# Patient Record
Sex: MALE | Race: WHITE
[De-identification: names, ages, dates, MRNs, and addresses within clinical notes are randomized per-mention and may not be internally consistent; named-entity substitution may affect disease eponyms.]

---

## 2020-04-14 ENCOUNTER — HOSPITAL ENCOUNTER (INPATIENT)
Dept: HOSPITAL 25 - ED | Age: 80
LOS: 1 days | Discharge: ANOTHER HOSPITAL | DRG: 872 | End: 2020-04-15
Attending: HOSPITALIST | Admitting: HOSPITALIST
Payer: MEDICARE

## 2020-04-14 DIAGNOSIS — M17.0: ICD-10-CM

## 2020-04-14 DIAGNOSIS — E78.00: ICD-10-CM

## 2020-04-14 DIAGNOSIS — E11.65: ICD-10-CM

## 2020-04-14 DIAGNOSIS — E78.5: ICD-10-CM

## 2020-04-14 DIAGNOSIS — N17.9: ICD-10-CM

## 2020-04-14 DIAGNOSIS — M47.9: ICD-10-CM

## 2020-04-14 DIAGNOSIS — F32.9: ICD-10-CM

## 2020-04-14 DIAGNOSIS — K76.0: ICD-10-CM

## 2020-04-14 DIAGNOSIS — K81.0: ICD-10-CM

## 2020-04-14 DIAGNOSIS — M16.0: ICD-10-CM

## 2020-04-14 DIAGNOSIS — I10: ICD-10-CM

## 2020-04-14 DIAGNOSIS — J38.7: ICD-10-CM

## 2020-04-14 DIAGNOSIS — R94.31: ICD-10-CM

## 2020-04-14 DIAGNOSIS — Z79.4: ICD-10-CM

## 2020-04-14 DIAGNOSIS — A41.9: Primary | ICD-10-CM

## 2020-04-14 DIAGNOSIS — K21.9: ICD-10-CM

## 2020-04-14 DIAGNOSIS — J98.6: ICD-10-CM

## 2020-04-14 DIAGNOSIS — N40.0: ICD-10-CM

## 2020-04-14 DIAGNOSIS — E83.42: ICD-10-CM

## 2020-04-14 DIAGNOSIS — Z88.0: ICD-10-CM

## 2020-04-14 DIAGNOSIS — M19.011: ICD-10-CM

## 2020-04-14 DIAGNOSIS — M19.012: ICD-10-CM

## 2020-04-14 LAB
ALBUMIN SERPL BCG-MCNC: 3.6 G/DL (ref 3.2–5.2)
ALBUMIN/GLOB SERPL: 1.1 {RATIO} (ref 1–3)
ALP SERPL-CCNC: 62 U/L (ref 34–104)
ALT SERPL W P-5'-P-CCNC: 18 U/L (ref 7–52)
AMYLASE SERPL-CCNC: 17 U/L (ref 29–103)
ANION GAP SERPL CALC-SCNC: 10 MMOL/L (ref 2–11)
AST SERPL-CCNC: 15 U/L (ref 13–39)
BASOPHILS # BLD AUTO: 0.1 10^3/UL (ref 0–0.2)
BUN SERPL-MCNC: 26 MG/DL (ref 6–24)
BUN/CREAT SERPL: 17 (ref 8–20)
CALCIUM SERPL-MCNC: 9 MG/DL (ref 8.6–10.3)
CHLORIDE SERPL-SCNC: 94 MMOL/L (ref 101–111)
CK MB SERPL-MCNC: 2.3 NG/ML (ref 0.6–6.3)
CK SERPL-CCNC: 100 U/L (ref 10–223)
EOSINOPHIL # BLD AUTO: 0 10^3/UL (ref 0–0.6)
GLOBULIN SER CALC-MCNC: 3.2 G/DL (ref 2–4)
GLUCOSE SERPL-MCNC: 284 MG/DL (ref 70–100)
HCO3 SERPL-SCNC: 26 MMOL/L (ref 22–32)
HCT VFR BLD AUTO: 46 % (ref 42–52)
HGB BLD-MCNC: 15.6 G/DL (ref 14–18)
LYMPHOCYTES # BLD AUTO: 0.7 10^3/UL (ref 1–4.8)
MAGNESIUM SERPL-MCNC: 1.2 MG/DL (ref 1.9–2.7)
MCH RBC QN AUTO: 28 PG (ref 27–31)
MCHC RBC AUTO-ENTMCNC: 34 G/DL (ref 31–36)
MCV RBC AUTO: 82 FL (ref 80–94)
MONOCYTES # BLD AUTO: 1 10^3/UL (ref 0–0.8)
NEUTROPHILS # BLD AUTO: 18.6 10^3/UL (ref 1.5–7.7)
NRBC # BLD AUTO: 0 10^3/UL
NRBC BLD QL AUTO: 0.1
PLATELET # BLD AUTO: 221 10^3/UL (ref 150–450)
POTASSIUM SERPL-SCNC: 3.6 MMOL/L (ref 3.5–5)
PROT SERPL-MCNC: 6.8 G/DL (ref 6.4–8.9)
RBC # BLD AUTO: 5.59 10^6 /UL (ref 4.18–5.48)
RBC UR QL AUTO: (no result)
SODIUM SERPL-SCNC: 130 MMOL/L (ref 135–145)
TROPONIN I SERPL-MCNC: 0.01 NG/ML (ref ?–0.03)
TSH SERPL-ACNC: 1.17 MCIU/ML (ref 0.34–5.6)
WBC # BLD AUTO: 20.4 10^3/UL (ref 3.5–10.8)
WBC UR QL AUTO: (no result)

## 2020-04-14 PROCEDURE — 82150 ASSAY OF AMYLASE: CPT

## 2020-04-14 PROCEDURE — 90732 PPSV23 VACC 2 YRS+ SUBQ/IM: CPT

## 2020-04-14 PROCEDURE — 99284 EMERGENCY DEPT VISIT MOD MDM: CPT

## 2020-04-14 PROCEDURE — 84484 ASSAY OF TROPONIN QUANT: CPT

## 2020-04-14 PROCEDURE — 71045 X-RAY EXAM CHEST 1 VIEW: CPT

## 2020-04-14 PROCEDURE — 96365 THER/PROPH/DIAG IV INF INIT: CPT

## 2020-04-14 PROCEDURE — 87086 URINE CULTURE/COLONY COUNT: CPT

## 2020-04-14 PROCEDURE — 93005 ELECTROCARDIOGRAM TRACING: CPT

## 2020-04-14 PROCEDURE — 84443 ASSAY THYROID STIM HORMONE: CPT

## 2020-04-14 PROCEDURE — 83605 ASSAY OF LACTIC ACID: CPT

## 2020-04-14 PROCEDURE — 93306 TTE W/DOPPLER COMPLETE: CPT

## 2020-04-14 PROCEDURE — 76705 ECHO EXAM OF ABDOMEN: CPT

## 2020-04-14 PROCEDURE — 85025 COMPLETE CBC W/AUTO DIFF WBC: CPT

## 2020-04-14 PROCEDURE — 82947 ASSAY GLUCOSE BLOOD QUANT: CPT

## 2020-04-14 PROCEDURE — 86140 C-REACTIVE PROTEIN: CPT

## 2020-04-14 PROCEDURE — 82550 ASSAY OF CK (CPK): CPT

## 2020-04-14 PROCEDURE — 36415 COLL VENOUS BLD VENIPUNCTURE: CPT

## 2020-04-14 PROCEDURE — 85610 PROTHROMBIN TIME: CPT

## 2020-04-14 PROCEDURE — 80053 COMPREHEN METABOLIC PANEL: CPT

## 2020-04-14 PROCEDURE — 83735 ASSAY OF MAGNESIUM: CPT

## 2020-04-14 PROCEDURE — 81003 URINALYSIS AUTO W/O SCOPE: CPT

## 2020-04-14 PROCEDURE — 83690 ASSAY OF LIPASE: CPT

## 2020-04-14 PROCEDURE — 81015 MICROSCOPIC EXAM OF URINE: CPT

## 2020-04-14 PROCEDURE — 82553 CREATINE MB FRACTION: CPT

## 2020-04-14 PROCEDURE — 85730 THROMBOPLASTIN TIME PARTIAL: CPT

## 2020-04-14 PROCEDURE — 83880 ASSAY OF NATRIURETIC PEPTIDE: CPT

## 2020-04-14 RX ADMIN — PIPERACILLIN AND TAZOBACTAM SCH MLS/HR: 3; .375 INJECTION, POWDER, LYOPHILIZED, FOR SOLUTION INTRAVENOUS; PARENTERAL at 22:23

## 2020-04-14 RX ADMIN — HEPARIN SODIUM SCH UNITS: 5000 INJECTION INTRAVENOUS; SUBCUTANEOUS at 22:21

## 2020-04-14 RX ADMIN — PANTOPRAZOLE SODIUM SCH MG: 40 TABLET, DELAYED RELEASE ORAL at 20:55

## 2020-04-14 RX ADMIN — SODIUM CHLORIDE SCH MLS/HR: 900 IRRIGANT IRRIGATION at 22:18

## 2020-04-14 RX ADMIN — INSULIN LISPRO SCH UNIT: 100 INJECTION, SOLUTION INTRAVENOUS; SUBCUTANEOUS at 20:53

## 2020-04-14 RX ADMIN — MORPHINE SULFATE PRN MG: 2 INJECTION, SOLUTION INTRAMUSCULAR; INTRAVENOUS at 19:24

## 2020-04-14 RX ADMIN — SODIUM CHLORIDE SCH MLS/HR: 900 IRRIGANT IRRIGATION at 17:49

## 2020-04-14 NOTE — PN
Progress Note





- Progress Note


Date of Service: 04/14/20


Note: 





Brief Surgery Note: (full consult dictated)





S: 80 yo male w/ sudden onset of midepigastric pain Sunday pm (after eating 

chicken wings earlier in the day). This then moved to the RUQ where it has 

remained. Associated w/ nausea and vomiting Sunday, but not since. He was seen 

in his PCP (Dr. Skiff) office yesterday and was told to come to the ED by 

ambulance 2/2 ECG changes. He is not sure why he didn't come, but ultimately 

came in today because of persistent pain. His pain is actually better today (

only 1-2/10, though worse w/ deep breath, vs 10/10 pain on Sunday). He denies 

fever/chills. No prior similar episodes. No change in the color of urine or 

stool. No prior abd surgery.





PMHx: DM (> 15 yr, w/ most recent A1C of ~ 10); HTN; HLD; GERD; OA of lower back

, hips, knees, shoulders; depression. Denies cardiac hx.





O: T 99.2 /75 P 105- 112 R 19


   Gen: WN WD male, appears younger than stated age, in NAD


   Heart: tachy, but reg


   Lungs: clear


   Abd: obese; +BS; soft; moderate tenderness w/ pos Perkins sign RUQ. No masses 

or organomegaly. Reducible nontender umb hernia.





Labs: WBC 20.4  Hgb 15.6  lytes: Na 130 K 3.6 Mg 1.2  BUN 26 creat 1.53  gluc 

284  LA 1.4  t bili 3 (other LFTs nl); amylase & lipase nl





US: GB "sludge" w/ wall thickening at 4 mm and a small amt of pericholecystic 

fluid; CBD nl at 4 mm





ECG w/ Q waves and T wave changes but no prior for comparison (trop and CK 

normal)





A: acute cholecystitis





P: admission (to medical [facility] – [place], [place]); IV abx; clear liqs; repeat labs in a.m. 

Discussed w/ Dr. Haro, who will also see patient. Patient understands and 

agrees. He also understands the potential for surgical intervention (lap watson) 

either acutely if no improvement, or in the future on a semi-elective basis.

## 2020-04-14 NOTE — HP
CC:  Dr. Skiff*

 

HISTORY AND PHYSICAL:

 

DATE OF ADMISSION:  04/14/20

 

TIME OF ADMISSION:  4:30 p.m.

 

PRIMARY CARE PHYSICIAN:  Dr. Skiff.

 

CHIEF COMPLAINT:  Abdominal pain.

 

HISTORY OF PRESENT ILLNESS:  This is an 79-year-old male with a history of 
diabetes and hypertension, who presents to the emergency department with 2 days 
of abdominal pain.  The pain started Sunday night and he localized the pain to 
the epigastric area and right upper quadrant without radiation.  He had eaten 
ham and wings for Easter dinner, and shortly after that, developed nausea and 
vomiting associated with the pain.  He was able to sleep, and the following day
, he went to his primary care physician's office who got an EKG and instructed 
him to come to the emergency department due to concerns of a myocardial 
infarction.  He declined, he went home and continued to have pain, but had no 
further nausea and vomiting.  He localized the pain again to the right upper 
quadrant without radiation.  It was not associated with diarrhea or fevers or 
any further nausea and vomiting.  This morning, Dr. Cryer's nurse called him 
because he was supposed to see Dr. Cryer regarding a laryngeal mass biopsy and 
she instructed him to come to the emergency department.

 

PAST MEDICAL HISTORY:

1.  Type 2 diabetes.

2.  Hypertension.

3.  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

4.  Hyperlipidemia.

 

PAST SURGICAL HISTORY:  He has history of throat polyps 15 years ago that were 
removed.  No other surgeries.

 

MEDICATIONS:  Home medications he takes:

1.  Detemir 50 units in the evening.

2.  Trulicity 0.5 mL weekly.

3.  Jardiance 10 mg daily.

4.  Glipizide 7.5 mg in the evening and 5 mg in the morning.

5.  Losartan plus HCTZ 100/12.5 mg daily.

6.  Metformin 1500 mg daily.

7.  Omeprazole 40 mg b.i.d.

8.  Rosuvastatin 40 mg daily.

9.  Venlafaxine 37.5 mg daily.

 

ALLERGIES:  PENICILLIN.

 

FAMILY HISTORY:  Significant for an unknown type of cancer.

 

SOCIAL HISTORY:  He never smoked tobacco.  He does not drink alcohol.  He lives 
with his wife, Nicole, who is his healthcare proxy.  He is full code.

 

REVIEW OF SYSTEMS:  He denies fevers, chills, chest pain, shortness of breath, 
weight gain, weight loss, sick contacts.  The remainder of the review of 
systems is as per the HPI.

 

                               PHYSICAL EXAMINATION

 

GENERAL:  Alert, well-appearing man who appears younger than stated age.

 

VITAL SIGNS:  Temperature 99.2, heart rate 101, respiratory rate 19, pulse ox 95
% on room air, blood pressure 143/79.

 

HEENT:  Pupils equal, round, and reactive to light.  Oral mucosa is very dry. 
Voice is hoarse.

 

NECK:  No JVP or adenopathy.

 

CHEST:  He is mildly tachycardic with no murmurs.  His lungs are clear 
bilaterally.

 

ABDOMEN:  Soft, tender in the right upper quadrant with a positive Perkins's 
sign. No CVA tenderness.  Bowel sounds are normoactive.

 

EXTREMITIES:  No edema, rashes, or ulcers.

 

NEUROLOGIC:  He is oriented and appropriate.  Occasionally forgetful.  Strength 
is 5/5 in all extremities.

 

 DIAGNOSTIC STUDIES/LAB DATA:  White blood cells 20.4, hemoglobin 15.6, and 
platelets 221.  PTT 37.  Sodium 130, potassium 3.6, chloride 94, BUN 26, 
creatinine 1.53, glucose 284.  Lactic acid 1.4.  Total bilirubin 3, magnesium 
1.2.  .69.  Troponin 0.01, 0.01.  BNP 66.  TSH 1.17.

 

Abdominal ultrasound:  Biliary sludge in the gallbladder with mild gallbladder 
wall thickening and small volume of pericholecystic fluid.  No visualized 
gallstones. Negative for sonographic Perkins's sign.  Negative for biliary 
dilatation.  Hepatic steatosis.

 

Chest x-ray:  Elevation of the right hemidiaphragm suggestive of diaphragmatic 
paralysis.

 

EKG:  Sinus tachycardia.  Left axis deviation.  Normal intervals.  No ST or T 
wave changes.  There are Q waves in III and aVF.

 

ASSESSMENT AND PLAN:  This is an 79-year-old man with a history of diabetes and 
hypertension, who presents to the emergency department with 2 days of abdominal 
pain and is found to have acute cholecystitis.

 

1.  Acute cholecystitis.  Surgery has already been consulted by the emergency 
department and [facility] has evaluated the patient.  Nonoperative management will 
be attempted and I will start him on piperacillin-tazobactam and I believe he 
needs more IV fluid resuscitation, which I will add now.  I will add some pain 
control. Of note, he does have a remote history of an allergy to PENICILLIN, 
but he thinks he last received it when he was 7 years old and his reaction was 
rash.  I will keep him on clear liquids.

2.  Sepsis, likely related to cholecystitis.  He did not receive the full 
sepsis IV fluids in the emergency department because there was some concern for 
history of congestive heart failure; however, he denies that to me and he has 
no evidence of congestive heart failure on my exam.  So, I am giving him the 
full 30 cc/kg of IV fluids along with Zosyn.  His lactic acid is normal.

3.  Type 2 diabetes.  I will reduce his dose of long-acting insulin while he is 
on clear liquids only and septic.  I reduced his long-acting insulin to 30 
units daily along with fingersticks and insulin sliding scale.

4.  Hypertension.  I will hold his losartan and HCTZ as he was hypotensive when 
he arrived; these can be added back as needed.

5.  Hypomagnesemia.  Likely related to poor p.o. intake.  Replete now.

6.  DVT prophylaxis.  Heparin subcutaneous.

7.  Acute kidney injury versus chronic kidney disease.  I have no baseline 
creatinine.  We will attempt to get records from his PCP.

8.  Abnormal EKG.  He has evidence of Q waves on his EKG.  I have requested 
records from his PCP of old EKGs as well.

9.  Full code.

 

 [id number]/CPS #: [id number]

MTDD

## 2020-04-14 NOTE — CONS
CC:  Dr. Skiff, Lena*

 

SURGICAL CONSULT NOTE:

 

DATE OF CONSULT:  04/14/20

 

ATTENDING SURGEON:  Dr. Radames Haro.

 

CHIEF COMPLAINT:  Abdominal pain.

 

HISTORY OF PRESENT ILLNESS:  This is an 79-year-old male who states that Sunday 
evening he noted sudden onset of abdominal pain in the epigastric region.  This 
was severe (up to 10/10) and steady and was associated with both nausea and 
some limited vomiting on Sunday.  He previously had some chicken wings to eat 
earlier in the day.  He denies fever or chills.  He denies diarrhea.  He did 
have normal bowel movement a couple of days ago.  He presented to his PCP 
office yesterday, at which time EKG changes suggested an acute cardiac problem 
and he was advised to come to the ED by ambulance.  He refused.  Because of 
persistent symptoms, he presented to the ED today.  He states that at the 
present time pain at rest is approximately 1- 2/10, though worse if he takes a 
deep breath or with palpation.  He denies any similar previous episodes.  He 
has not noted any change in the color of his urine or stools.  He denies any  
symptoms.  There is no definite known family history of gallbladder disease.  
He has not had any prior abdominal surgeries.

 

PAST MEDICAL HISTORY:  Type 2 diabetes for greater than 15 years (he states 
that his most recent A1c was around 10).  He is also treated for hypertension, 
GERD, hyperlipidemia, and depression.  He has a history of osteoarthritis of 
the lower back as well as hips, knees, and shoulders.  He denies history of 
heart disease, asthma, COPD, or personal history of cancer.

 

PAST SURGICAL HISTORY:  Previous surgeries are limited to removal of laryngeal 
polyps (he was seen by Dr. Cryer last week for evaluation of same with 
tentative procedure or biopsy planned possibly for this week).

 

CURRENT MEDICATIONS AT HOME:

1.  Dulaglutide 0.5 mL subcutaneously once weekly.

2.  Empagliflozin 10 mg once daily.

3.  Insulin detemir 50 units q.p.m.

4.  Glipizide 5 mg b.i.d.

5.  Metformin 500 mg b.i.d.

6.  Losartan/HCTZ 100/12.5 once daily.

7.  Omeprazole 40 mg b.i.d.

8.  Rosuvastatin 40 mg once daily.

9.  Venlafaxine extended release 37.5 mg once daily.

 

DRUG ALLERGIES:  PENICILLIN (reaction not specified).

 

FAMILY HISTORY:  Negative for gallbladder disease.

 

SOCIAL HISTORY:  The patient is .  He previously worked in a 
[employer]. He denies use of tobacco.  He used to drink alcohol excessively at 
times, but not in recent years.  He denies use of recreational drugs.

 

REVIEW OF SYSTEMS:  General:  As above.  Weight has been stable.  No other 
recent constitutional or acute symptoms.  Skin:  No recent rashes or lesions.  
HEENT: Recently seen by Dr. Cryer regarding evaluation of laryngeal polyps.  No 
other acute problems reported.  Cardiovascular:  As above.  No additions.  
Respiratory: No additions.  GI:  As above.  :  No additions.  Endocrine:  
Type 2 diabetes.  No history of thyroid dysfunction.

 

PHYSICAL EXAM:  Height 6 feet 1 inch, weight 201 pounds, temperature 99.2, 
blood pressure 127/75, pulse 105 to 112, respirations 19, room air saturation 94
%. General:  Well-nourished, well-developed male, in no acute distress, 
appearing fairly comfortable and younger than his stated age of 79.  Skin:  
Warm and dry.  No suspicious rashes or lesions.  HEENT:  Pupils are equal, round
, and reactive.  EOMs intact.  No conjunctival pallor or scleral icterus.  
Oropharynx:  Teeth in good repair.  No intraoral lesions.  Neck:  No 
lymphadenopathy, thyromegaly, or masses. Heart:  Regular rate and rhythm.  
Mildly tachycardic.  No murmur appreciated. Lungs:  Clear to auscultation.  No 
rales or wheezes.  Abdomen:  Mildly obese. Bowel sounds present.  Small, 
nontender, reducible umbilical hernia.  Abdomen is generally soft, but with 
moderate tenderness in the right upper quadrant and positive Perkins sign.  No 
palpable masses or organomegaly.  No palpable hernias other than at the 
umbilicus.  Back:  No spinous process or CVA tenderness. Extremities:  No 
edema.  Neurological:  Grossly intact, though he is not always consistent in 
his history.

 

DIAGNOSTIC STUDIES/LAB DATA:  Of note, white blood cell count 20,400, 
hemoglobin 15.6, hematocrit 46.  Electrolytes notable for sodium of 130, 
potassium of 3.6, chloride of 94, magnesium of 1.2, lactic acid is normal at 1.4
, BUN and creatinine are elevated at 26 and 1.53, glucose is 284.  CRP is 265.  
Total bilirubin is 3 (no previous for comparison).  Other liver function tests 
are normal as are amylase and lipase.  Troponin is 0.01, CK-MB is normal at 2.3.

 

Diagnostic imaging includes a chest x-ray showing elevated right hemidiaphragm.

 

Ultrasound of the right upper quadrant shows gallbladder sludge with 
gallbladder wall thickening of 4.3 mm and a small amount of pericholecystic 
fluid.  There is slight increased echogenicity of the liver.  The common bile 
duct is normal at 4 mm.

 

IMPRESSION:  Acute cholecystitis.

 

PLAN:  Case was discussed with Dr. Haro as well as Dr. Ceja.  He will be 
admitted to the hospitalist service with initiation of IV antibiotics as well 
as pain control and management of his diabetes and medical comorbidities.  
Repeat lab work in the morning with serial abdominal exams.  Hopefully, the 
patient will improve with medical management at the present time.  He 
understands that if he is not improving he may require surgical intervention 
acutely during this admission (i.e., laparoscopic cholecystectomy) and/or 
cholecystectomy may be recommended at a later date.

 

____________________________________ 

ED DE LA CRUZ

 

[id number]/CPS #: [id number]

JUVENCIO

## 2020-04-14 NOTE — ED
Abdominal Pain/Male





- HPI Summary


HPI Summary: 





This patient is an 78 y/[facility]. Patient 

states yesterday he had pain in the epigastric area/lower chest area. He notes 

he also developed nausea and vomiting yesterday. Today patient states he has 

mild pain in his right upper abdomen. Patient reports he was sent to the ED by 

his primary care provider because he was told he had a small heart attack. 

Patient denies fever, chills, shortness of breath, headache. 





 Home Medications











 Medication  Instructions  Recorded  Confirmed  Type


 


Dulaglutide (NF) [Trulicity (NF)] 0.5 ml SUBCUT WEEKLY 04/14/20 04/14/20 History


 


Empagliflozin (NF) [Jardiance (Nf)] 10 mg PO QAM 04/14/20 04/14/20 History


 


Insulin Detemir [Levemir Flextouch 50 units SUBCUT DAILY 04/14/20 04/14/20 

History





100 units/ml 3 ml x 5 Pens]    


 


Losartan/Hydrochlorothiazide 1 tab PO DAILY 04/14/20 04/14/20 History





[Losartan Potassium/Hydroc    





100-12.5 mg]    


 


Omeprazole (Nf) [Prilosec (NF)] 40 mg PO BID 04/14/20 04/14/20 History


 


Rosuvastatin (NF) [Crestor (NF)] 40 mg PO DAILY 04/14/20 04/14/20 History


 


Venlafaxine EXT RELEASE CAP* 37.5 mg PO DAILY 04/14/20 04/14/20 History





[Effexor Xr CAP*]    


 


glipiZIDE TAB* [Glucotrol TAB*] 2.5 mg PO QPM 04/14/20 04/14/20 History


 


glipiZIDE TAB* [Glucotrol TAB*] 5 mg PO BID 04/14/20 04/14/20 History


 


metFORMIN* [Glucophage 500 MG TAB 1,500 mg PO .WITH EVENING MEAL 04/14/20 04/14/ 20 History





*]    














- History of Current Complaint


Chief Complaint: EDShortnessOfBreath


Stated Complaint: FEVER, SORE THROAT


Hx Obtained From: Patient


Onset/Duration: Lasting Days - 1, Still Present


Timing: Lasting Days - 1


Severity Currently: Mild


Pain Scale Used: 0-10 Numeric


Location: Other - right upper quadrant


Radiates: No


Aggravating Factor(s): Nothing


Alleviating Factor(s): Nothing


Associated Signs And Symptoms: Positive: Chest Pain, Nausea, Vomiting.  Negative

: Fever, Other - NEGATIVE: shortness of breath, headache





- Allergies/Home Medications


Allergies/Adverse Reactions: 


 Allergies











Allergy/AdvReac Type Severity Reaction Status Date / Time


 


Penicillins Allergy  Rash Verified 04/14/20 12:38











Home Medications: 


 Home Medications





Dulaglutide (NF) [Trulicity (NF)] 0.5 ml SUBCUT WEEKLY 04/14/20 [History 

Confirmed 04/14/20]


Empagliflozin (NF) [Jardiance (Nf)] 10 mg PO QAM 04/14/20 [History Confirmed 04/ 14/20]


Insulin Detemir [Levemir Flextouch 100 units/ml 3 ml x 5 Pens] 50 units SUBCUT 

DAILY 04/14/20 [History Confirmed 04/14/20]


Losartan/Hydrochlorothiazide [Losartan Potassium/Hydroc 100-12.5 mg] 1 tab PO 

DAILY 04/14/20 [History Confirmed 04/14/20]


Omeprazole (Nf) [Prilosec (NF)] 40 mg PO BID 04/14/20 [History Confirmed 04/14/ 20]


Rosuvastatin (NF) [Crestor (NF)] 40 mg PO DAILY 04/14/20 [History Confirmed 04/ 14/20]


Venlafaxine EXT RELEASE CAP* [Effexor Xr CAP*] 37.5 mg PO DAILY 04/14/20 [

History Confirmed 04/14/20]


glipiZIDE TAB* [Glucotrol TAB*] 2.5 mg PO QPM 04/14/20 [History Confirmed 04/14/ 20]


glipiZIDE TAB* [Glucotrol TAB*] 5 mg PO BID 04/14/20 [History Confirmed 04/14/20

]


metFORMIN* [Glucophage 500 MG TAB *] 1,500 mg PO .WITH EVENING MEAL 04/14/20 [

History Confirmed 04/14/20]


Atorvastatin* [Lipitor 80 MG*] 80 mg PO DAILY  tab 04/15/20 [Rx]


HYDROmorphone INJ1* [Dilaudid INJ1S*] 1.5 mg IV SLOW PU Q4H PRN  syringe 04/15/

20 [Rx]


Insulin GLARGINE(*) [Lantus 100 units/ml 10 ml VIAL (*)] 40 units SUBCUT Q24H  

unit 04/15/20 [Rx]


Insulin LISPRO* [HumaLOG 100 units/ml 3 ml VIAL *] 0 units SUBCUT ACHS  unit 04/

15/20 [Rx]


Pantoprazole TAB * [Protonix TAB*] 40 mg PO BID  tab 04/15/20 [Rx]


Venlafaxine EXT RELEASE CAP* [Effexor Xr CAP*] 37.5 mg PO DAILY  cap.sr 04/15/

20 [Rx]


Zosyn per Pharmacy* 1 note FOLLOW UP .ZOSYN PER PHARMACY  note 04/15/20 [Rx]











PMH/Surg Hx/FS Hx/Imm Hx


Previously Healthy: No - PMHx of obesity


Endocrine/Hematology History: Reports: Hx Diabetes - type 2, Other Endocrine/

Hematological Disorders - hypomagnesemia


Cardiovascular History: Reports: Hx Hypercholesterolemia, Hx Hypertension


 History: Reports: Hx Benign Prostatic Hyperplasia


Infectious Disease History: 


   Denies: Traveled Outside the [place] in Last 30 Days





- Family History


Known Family History: 


   Negative: Cardiac Disease





- Social History


Alcohol Use: None


Alcohol Amount: quit drinking in 2008


Substance Use Type: Reports: None


Smoking Status (MU): Never Smoked Tobacco





Review of Systems


Negative: Fever, Chills


Positive: Chest Pain


Negative: Shortness Of Breath


Positive: Abdominal Pain, Vomiting, Nausea


Negative: Headache


All Other Systems Reviewed And Are Negative: Yes





Physical Exam





- Summary


Physical Exam Summary: 





VITAL SIGNS: Reviewed.


GENERAL: Patient is a well-developed and nourished male who is lying 

comfortable in the stretcher. Patient is not in any acute respiratory distress.


HEAD AND FACE: No signs of trauma. No ecchymosis, hematomas or skull 

depressions. No sinus tenderness.


EYES: PERRLA, EOMI x 2, No injected conjunctiva, no nystagmus.


EARS: Hearing grossly intact. Ear canals and tympanic membranes are within 

normal limits.


MOUTH: Oropharynx within normal limits.


NECK: Supple, trachea is midline, no adenopathy, no JVD, no carotid bruit, no c-

spine tenderness, neck with full ROM.


CHEST: Symmetric, no tenderness at palpation


LUNGS: Clear to auscultation bilaterally. No wheezing or crackles.


CVS: Regular rate and rhythm, S1 and S2 present, no murmurs or gallops 

appreciated.


ABDOMEN: Soft, mild tenderness in the right upper quadrant. No signs of 

distention. No rebound no guarding, and no masses palpated. Bowel sounds are 

normal.


EXTREMITIES: FROM in all major joints, no edema, no cyanosis or clubbing.


NEURO: Alert and oriented x 3. No acute neurological deficits. Speech is normal 

and follows commands.


SKIN: Dry and warm


Triage Information Reviewed: Yes


Vital Signs On Initial Exam: 





 Initial Vitals











Pulse BP Pulse Ox


 


 113   132/71   93 


 


 04/14/20 12:37  04/14/20 12:37  04/14/20 12:37





Temperature: 99.2 F temporal


Vital Signs Reviewed: Yes





Procedures





- Sedation


Patient Received Moderate/Deep Sedation with Procedure: No





Diagnostics





- Laboratory


Result Diagrams: 


 04/15/20 05:41





 04/15/20 05:41


Lab Statement: Any lab studies that have been ordered have been reviewed, and 

results considered in the medical decision making process.





- Radiology


  ** Chest XR


Radiology Interpretation Completed By: Radiologist


Summary of Radiographic Findings: IMPRESSION: Elevation of the right 

hemidiaphragm suggestive of diaphragmatic paralysis. Dr. Reese has reviewed 

this report.





- Ultrasound


  ** No standard instances


Ultrasound Interpretation Completed By: Radiologist


Summary of Ultrasound Findings: Abdomen US IMPRESSION: #. Biliary sludge in the 

gallbladder with mild gallbladder wall thickening and small volume of 

pericholecystic fluid. No visualized gallstones. #. Negative for sonographic 

Perkins's sign. #. Negative for biliary dilatation. #. Hepatosteatosis. Dr. Reese has reviewed this report.





- EKG


  ** 1233


Cardiac Rate: Tachycardia - at 114 bpm


EKG Rhythm: Sinus Tachycardia


EKG Comparison: Other - No previous EKG for comparison


Summary of EKG Findings: EKG at 1233 shows sinus tachycardia at a rate of 114 

bpm. No STEMI. Q waves in leads III, aVF, V1-V3. Inverted T wave in V6. No old 

EKG for comparison. This EKG was interpreted and reviewed by ED physician.





Re-Evaluation





- Re-Evaluation


  ** First Eval


Re-Evaluation Time: 15:20


Comment: Surgical PAAshkan, in to see patient.





Abdominal Pain Male Course/Dx





- Course


Assessment/Plan: This patient is an 78 y/o male presenting to [facility] c/o 

abdominal pain. Patient states yesterday he had pain in the epigastric area/

lower chest area. He notes he also developed nausea and vomiting yesterday. 

Today, patient states he has mild pain in his right upper abdomen. Patient 

reports he was sent to the ED by his primary care provider because he was told 

he had a small heart attack. Patient denies fever, chills, shortness of breath, 

headache.  In the ED course the patient was placed in a cardiac monitor, IV 

access was obtained, IV fluids were started.  Past medical records reviewed.  

CXR impression: Elevation of the right diaphragm suggestive of diaphragmatic 

paralysis.  Blood test w/o a significant abnormality except for WBCs of 20.4, 

absolute neutrophils is 18.6, sodium 130, chloride 94, BUN is 26, creatinine 

1.53, glucose is 284, magnesium is 1.2, total bilirubin is 3, CRP is 264.69.  

RUQ U/S IMPRESSION:  #. Biliary sludge in the gallbladder with mild gallbladder 

wall thickening and small volume of pericholecystic fluid. No visualized 

gallstones.  #. Negative for sonographic Perkins's sign.  #. Negative for 

biliary dilatation.  #. Hepatosteatosis.  At 2:36 PM I discussed the case with 

Dr. Haro, surgeon, and he will come to see the patient in the ED.  ED Johnson, working with Dr. Haro assessed the patient and recommends admission to 

the hospitalist since there is not an immediate need for surgery.  At 3:42 PM: 

I discussed my physical exam and test results with Dr. Ceja from the 

hospitalist services and she agrees to admit the patient to her services. The 

patient is hemodynamically stable.





- Diagnoses


Provider Diagnoses: 


 Acute cholecystitis








- Provider Notifications


Discussed Care Of Patient With: Radames Haro


Time Discussed With Above Provider: 14:30


Instructed by Provider To: Other - Discussed with Dr. Haro, surgeon, who will 

come see patient in the ED. [15:41] Discussed with Dr. Ceja, hospitalist, who 

accepted the patient for admission.





- Critical Care Time


Critical Care Statement: Critical care time is provided exclusive of any time 

spent performing procedures.





Discharge ED





- Sign-Out/Discharge


Documenting (check all that apply): Patient Departure - Admit to [facility]





- Discharge Plan


Condition: Stable


Disposition: ADMITTED TO [facility]





- Billing Disposition and Condition


Condition: STABLE


Disposition: Admitted to [facility]





- Attestation Statements


Document Initiated by Scribe: Yes


Documenting Scribe: Brenda Jones


Provider For Whom Scribe is Documenting (Include Credential): Adryan Reese MD


Scribe Attestation: 


I, Brenda Jones, scribed for Adryan Reese MD on 04/15/20 at 1223. 


Scribe Documentation Reviewed: Yes


Provider Attestation: 


The documentation as recorded by the scribe, Brenda Jones accurately reflects 

the service I personally performed and the decisions made by me, Adryna Reese MD


Status of Scribe Document: Viewed

## 2020-04-15 VITALS — SYSTOLIC BLOOD PRESSURE: 136 MMHG | DIASTOLIC BLOOD PRESSURE: 63 MMHG

## 2020-04-15 LAB
ALBUMIN SERPL BCG-MCNC: 3 G/DL (ref 3.2–5.2)
ALBUMIN/GLOB SERPL: 1.1 {RATIO} (ref 1–3)
ALP SERPL-CCNC: 71 U/L (ref 34–104)
ALT SERPL W P-5'-P-CCNC: 25 U/L (ref 7–52)
ANION GAP SERPL CALC-SCNC: 11 MMOL/L (ref 2–11)
AST SERPL-CCNC: 21 U/L (ref 13–39)
BASOPHILS # BLD AUTO: 0.1 10^3/UL (ref 0–0.2)
BUN SERPL-MCNC: 25 MG/DL (ref 6–24)
BUN/CREAT SERPL: 14.1 (ref 8–20)
CALCIUM SERPL-MCNC: 8.2 MG/DL (ref 8.6–10.3)
CHLORIDE SERPL-SCNC: 97 MMOL/L (ref 101–111)
EOSINOPHIL # BLD AUTO: 0 10^3/UL (ref 0–0.6)
GLOBULIN SER CALC-MCNC: 2.8 G/DL (ref 2–4)
GLUCOSE SERPL-MCNC: 308 MG/DL (ref 70–100)
HCO3 SERPL-SCNC: 23 MMOL/L (ref 22–32)
HCT VFR BLD AUTO: 41 % (ref 42–52)
HGB BLD-MCNC: 14.2 G/DL (ref 14–18)
INR PPP/BLD: 1.55 (ref 0.82–1.09)
LYMPHOCYTES # BLD AUTO: 0.6 10^3/UL (ref 1–4.8)
MAGNESIUM SERPL-MCNC: 1.4 MG/DL (ref 1.9–2.7)
MCH RBC QN AUTO: 28 PG (ref 27–31)
MCHC RBC AUTO-ENTMCNC: 35 G/DL (ref 31–36)
MCV RBC AUTO: 81 FL (ref 80–94)
MONOCYTES # BLD AUTO: 1 10^3/UL (ref 0–0.8)
NEUTROPHILS # BLD AUTO: 19.5 10^3/UL (ref 1.5–7.7)
NRBC # BLD AUTO: 0 10^3/UL
NRBC BLD QL AUTO: 0
PLATELET # BLD AUTO: 202 10^3/UL (ref 150–450)
POTASSIUM SERPL-SCNC: 3.4 MMOL/L (ref 3.5–5)
PROT SERPL-MCNC: 5.8 G/DL (ref 6.4–8.9)
RBC # BLD AUTO: 4.98 10^6 /UL (ref 4.18–5.48)
SODIUM SERPL-SCNC: 131 MMOL/L (ref 135–145)
WBC # BLD AUTO: 21.2 10^3/UL (ref 3.5–10.8)

## 2020-04-15 RX ADMIN — PANTOPRAZOLE SODIUM SCH MG: 40 TABLET, DELAYED RELEASE ORAL at 09:31

## 2020-04-15 RX ADMIN — PIPERACILLIN AND TAZOBACTAM SCH MLS/HR: 3; .375 INJECTION, POWDER, LYOPHILIZED, FOR SOLUTION INTRAVENOUS; PARENTERAL at 05:49

## 2020-04-15 RX ADMIN — INSULIN LISPRO SCH UNIT: 100 INJECTION, SOLUTION INTRAVENOUS; SUBCUTANEOUS at 09:30

## 2020-04-15 RX ADMIN — MORPHINE SULFATE PRN MG: 2 INJECTION, SOLUTION INTRAMUSCULAR; INTRAVENOUS at 01:58

## 2020-04-15 RX ADMIN — HEPARIN SODIUM SCH UNITS: 5000 INJECTION INTRAVENOUS; SUBCUTANEOUS at 05:51

## 2020-04-15 RX ADMIN — INSULIN LISPRO SCH UNIT: 100 INJECTION, SOLUTION INTRAVENOUS; SUBCUTANEOUS at 12:04

## 2020-04-15 NOTE — ECHO
*[facility]*

[place] [address]

Phone: [phone]

Fax #: [phone]



-------------------------------------------------------------------

Transthoracic Echocardiogram



Patient: Ranjit Dotson                    MRN:        [id number]

:     1940                         Study Date: 04/15/2020

Age:     79                                 Accession#: [id number]

Gender:  M                                  HR:         107 bpm

Height:  72 in /182.9 cm                    BSA:        2.13 m^2

Weight:  200.6 lb /91.2 kg                  BMI:        27.3 kg/m^2



*Sonographer: * Leeann Francois [facility]

 

*Referring Physician: * Gladys Ceja

*Reading Physician: * Estephania Au MD



-------------------------------------------------------------------

Indications:   Abnormal EKG.



-------------------------------------------------------------------

History:   Risk factors:  Hypertension. Diabetes mellitus.

Dyslipidemia.



-------------------------------------------------------------------

Conclusions



Summary:



- Left ventricle: The cavity size is mildly reduced. Wall thickness

  is mildly to moderately increased. Wall motion is normal; there

  are no regional wall motion abnormalities.

- Right ventricle: Systolic function is normal.

- Mitral valve: The leaflets are mildly thickened.

- Aortic valve: Cusp separation is normal.

- Tricuspid valve: There is trace regurgitation.

- Pulmonary arteries: Systolic pressure is mildly increased. The

  peak pressure during systole by Doppler is 37.0 mm Hg.

- No prior echocardiogram to compare.



-------------------------------------------------------------------

Study data:  Transthoracic echocardiogram.  Procedure:

Transthoracic echocardiography was performed. Image quality was

good.  Complete 2D, spectral Doppler, and color flow Doppler.

Location:  Bedside.  Patient status:  Inpatient. Patient room

number: 348.  Rhythm:  Tachycardia.



-------------------------------------------------------------------

Findings



Left ventricle:  The cavity size is mildly reduced. Wall thickness

is mildly to moderately increased. Systolic function is normal. The

estimated ejection fraction is 60-65%. Wall motion is normal; there

are no regional wall motion abnormalities. Left ventricular

diastolic function parameters are normal for the patient&apos;s age.

Right ventricle:  The cavity size is normal. Systolic function is

normal.

Left atrium:  The atrium is normal in size.

Right atrium:  The atrium is normal in size.

Mitral valve:  The leaflets are mildly thickened.  There is no

evidence of stenosis.   There is no significant regurgitation.

Aortic valve:   The valve is trileaflet. The leaflets are normal

thickness. Cusp separation is normal.  There is no evidence of

stenosis.   There is no significant regurgitation.

Tricuspid valve:  The leaflets are normal thickness.  There is no

evidence of stenosis.   There is trace regurgitation.

Pulmonic valve:   The leaflets are normal thickness.  There is no

evidence of stenosis.   There is mild regurgitation.

Aorta:  The aortic root appears normal. The aortic arch appears

normal.

Pericardium:  There is no significant pericardial effusion.

Pulmonary arteries:

Systolic pressure is mildly increased.

Systemic veins:

Inferior vena cava: Not well visualized.



-------------------------------------------------------------------

Measurements



 Left ventricle            Value        Ref         Aortic valve continued     Value        Ref

 JOSE, LAX          (L)     3.8   cm     4.2 - 5.8   Peak v, S                  1.26  m/sec  ---------


 ESD, LAX                  2.5   cm     2.5 - 4.0   VTI, S                     20.3  cm     ---------


 FS, LAX                   34    %      25 - 43     Mean grad, S               4.0   mm Hg  ---------


 PW, ED, LAX       (H)     1.2   cm     0.6 - 1.0   Peak grad, S               6.0   mm Hg  ---------


 E&apos;, lat kenn, TDI          10.2  cm/sec &gt;=10.0      LVOT/AV, VTI               0.89         -
--------

 E/e&apos;, lat kenn,            6            ----------  ratio

 TDI

 E&apos;, med kenn, TDI          8.1   cm/sec &gt;=7.0       Mitral valve               Value        R
ef

 E/e&apos;, med kenn,            8            ----------  Peak E                     0.66  m/sec  ----
-----

 TDI                                                Peak A                     0.86  m/sec  ---------


 E&apos;, avg, TDI              9.2   cm/sec ----------  Decel time                 61    ms     ----
-----

 E/e&apos;, avg, TDI            7            &lt;=14        Peak E/A ratio             0.8          -
--------

 

 LVOT                      Value        Ref         Pulmonic valve             Value        Ref

 Peak fredy, S               1     m/sec  ----------  Peak v, S                  0.75  m/sec  ---------


 VTI, S                    18.0  cm     ----------  Peak grad, S               2.0   mm Hg  ---------


 Peak grad, S              4     mm Hg  ----------

 Mean grad, S              2     mm Hg  ----------  Tricuspid valve            Value        Ref

                                                    TR peak v         (H)      3     m/sec  &lt;=2.8

 Ventricular septum        Value        Ref         Peak RV-RA grad,           36    mm Hg  ---------


 IVS, ED           (H)     1.4   cm     0.6 - 1.0   S

 

 Right ventricle           Value        Ref         Aortic root                Value        Ref

 JOSE, LAX                  3.7   cm     ----------  Root diam                  3.3   cm     &lt;4.2

 JOSE minor ax, A4C         3.1   cm     1.9 - 3.5   Root max                   1.6   cm/m^2 1.3 - 2.1


 mid                                                diam/bsa, ED

 Pressure, S               39    mm Hg  ----------

                                                    Ascending aorta            Value        Ref

 Left atrium               Value        Ref         AAo AP diam, S             2.4   cm     ---------


 AP dim, ES                4.00  cm     3.00 -      AAo AP diam/bsa,           1.1   cm/m^2 ---------


                                        4.00        S

 ML dim, A4C               4.0   cm     ----------

 SI dim, A4C               5.3   cm     ----------  Aortic arch                Value        Ref

 Vol/bsa, ES, A/L          24    ml/m^2 16 - 34     Arch diam                  2.8   cm     ---------


                                                    Arch diam/bsa              1.3   cm/m^2 ---------


 Right atrium              Value        Ref

 SI dim, ES                5.0   cm     3.4 - 5.3   Decending aorta            Value        Ref

 ML dim, ES, A4C           3.6   cm     2.6 - 4.4   Capo peak fredy               0.88  m/sec  ---------


 Estimated RAP             3     mm Hg  ----------

                                                    Pulmonary artery           Value        Ref

 Aortic valve              Value        Ref         Pressure, S                37.0  mm Hg  ---------


 Kenn diam, ED              2.3   cm     ----------

 

Legend:

(L)  and  (H)  henry values outside specified reference range.



Prepared and electronically signed by



Estephania Au MD

04/15/2020 12:11

## 2020-04-15 NOTE — PN
Progress Note





- Progress Note


Date of Service: 04/15/20


Note: 











Patient seen and examined


Workup-labs and US reviewed


Care discussed with KOURTNEY Ceja with Hospitalist service





He is having more pain RUQ


No fever but HR up





PEX:


Appears uncomfortable


Abd is soft and distended. BS decreased. Tenderness with guarding RUQ with 

localized peritoneal signs-no generalized abdominal pain





IMP: Acute cholecystitis


WBC elevated from yesterday, BS elevated and appears septic





Discussed with Dr. Ceja. She feel he is a high operative risk for 

cholecystectomy. He is failing medical care with IV antibiotics and I'm 

concerned he will worsen. Recommend percutaneous drainage as initial treatment 

course in light of his present condition and underlying medical problems. IR 

not available here at [facility] this week.


Will transfer to a higher level of care where IR services available today. All 

discussed with patient.

## 2020-04-15 NOTE — TRS
CC:  Dr. Skiff, [facility]*

 

DATE OF ADMISSION:  04/14/2020.

 

DATE OF TRANSFER:  04/15/2020.

 

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  He 
has been accepted to [facility] by Dr. Rosales of the Medicine 
Service.

 

PRINCIPAL DISCHARGE DIAGNOSES:

1.  Acute cholecystitis.

2.  Sepsis.

3.  Abnormal EKG.

 

SECONDARY DISCHARGE DIAGNOSES:

1.  Poorly controlled diabetes.

2.  Hypertension.

3.  Acute kidney injury.

 

MEDICATIONS FOR TRANSFER:

1.  Atorvastatin 80 mg daily.

2.  Dilaudid 1.5 mg IV slow push q.4 prn pain.

3.  Lantus 40 units at bedtime.

4.  Lispro sliding scale a.c., at bedtime.

5.  Pantoprazole 40 mg b.i.d.

6.  Venlafaxine 37.5 mg daily.

7.  Zosyn 3.375 q.8.

8.  Heparin 5,000 units q.8.

 

PHYSICAL EXAMINATION:  Please see my progress note from this morning.

 

PERTINENT STUDIES ON THIS ADMISSION:  An abdominal ultrasound performed on 04/14
/2020 showed biliary sludge in the gallbladder with mild gallbladder wall 
thickening and small volume of pericholecystic fluid.  No visualized 
gallstones. Negative for sonographic Perkins sign.  Negative biliary dilatation 
and hepatosteatosis.

 

LABORATORY DATA:  Labs from 04/15/2020:  Sodium 131, potassium 3.4, chloride 97
, bicarb 23, BUN 25, creatinine 1.77, glucose 308, calcium 8.2, magnesium 1.4, 
total bilirubin 2.7, AST 21, ALT 25, alk phos 71; white blood cells 21.2, 
hemoglobin 14.2, platelets 202; INR 1.55.

 

CONSULTATIONS DURING THIS ADMISSION:  Dr. Monico Baker of General Surgery.

 

HOSPITAL COURSE BY PROBLEM:

1.  Acute cholecystitis with sepsis:  Mr. Dotson was admitted to the Medical 
Service with acute cholecystitis and was started on Zosyn with IV fluid 
resuscitation for sepsis an pain control.  He had minimal improvement over the 
first 12 hours and his white count increased.  His CRISTIAN worsened and his pain 
was uncontrolled.  He was re-evaluated by Surgery this morning, April 15th, and 
they recommended percutaneous drainage of the acute cholecystitis.  
Unfortunately, Interventional Radiology is not available at [facility] this week, so I 
have discussed the case with Dr. Rosales at [place] who accepts the patient to 
his service.  Today is Pip/Tazo day two.  This morning I have increased his 
volume resuscitation to normal saline at 200 cc per hour and increased his pain 
control to Dilaudid 1.5 mg q.4 prn pain.

2.  CRISTIAN:  I got records from his PCP's office.  His creatinine in January of 
this year was 1.2.  I have increased his normal saline rate as above and I 
suspect this is related to sepsis.

3.  Poorly controlled diabetes:  Mr. Dotson's A1c was 10 in January.  He is on 
a lot of p.o. diabetes medications at home.  I transitioned his Detemir to 
Lantus, but he is still uncontrolled with blood sugars in the 300s this 
morning.  He takes his Lantus in the evening and he got 30 units last night.  I 
gave him 15 units of NPH this morning to cover his throughout the day and 
increased his evening dose of Lantus tonight at 40 units nightly.  He is 
currently on clear liquids.

4.  Hypertension:  He is on Losartan HCTZ at home, but I have held both of 
these and he has remained normotensive off of them.

5.  EKG changes:  He has ischemic EKG changes with Q-waves inferiorly on his 
EKG. His troponins were negative times three here and he has no chest pain.  He 
recalls no history of an MI, but I suspect an occult MI which raises his 
operative risk as well.

6.  DVT prophylaxis:  He has been on Heparin subcutaneously since his arrival.

7.  Code status:  He is full code.  He does have a wife named Nicole who is his 
healthcare proxy.

 

CONDITION ON DISCHARGE:  Fair, guarded.

 

DISPOSITION:  Mr. Dotson has been accepted by Dr. Rosales to the Hospitalist 
Service at [place] for a higher level of care and access to interventional 
radiology for percutaneous drainage of acute cholecystitis.

 

 [id number]/[facility] #: [id number]

[facility]

## 2020-04-15 NOTE — PN
Subjective


Date of Service: 04/15/20


Interval History: 





Pain was poorly controlled last night on morphine.  BGs poorly controlled.  He 

was able to eat some jello last night.  He feels bad this morning because pain 

is still uncontrolled.  No nausea, no fevers, no shortness of breath, no chest 

pain. 





Objective


Active Medications: 








Atorvastatin Calcium (Lipitor*)  80 mg PO DAILY [facility]


Dextrose (D50w Syringe 50 Ml*)  12.5 gm IV PUSH .FOR FS < 60 - SS PRN


   PRN Reason: FS < 60


Heparin Sodium (Porcine) (Heparin Vial(*))  5,000 units SUBCUT Q8HR [facility]


   Last Admin: 04/15/20 05:51 Dose:  5,000 units


Sodium Chloride (Ns 0.9% 1000 Ml**)  1,000 mls @ 100 mls/hr IV PER RATE [facility]


   Last Admin: 04/14/20 22:18 Dose:  100 mls/hr


Piperacillin Sod/Tazobactam (Sod 3.375 gm/ Sodium Chloride)  100 mls @ 25 mls/

hr IVPB Q8H [facility]


   Last Admin: 04/15/20 05:49 Dose:  25 mls/hr


Insulin Glargine (Lantus(*))  40 units SUBCUT Q24H [facility]


Insulin Human Lispro (Humalog*)  0 units SUBCUT ACHS [facility]; Protocol


   Last Admin: 04/14/20 20:53 Dose:  10 unit


Morphine Sulfate (Morphine Inj (Syringe))*)  4 mg IV Q4H PRN


   PRN Reason: PAIN - SEVERE


   Last Admin: 04/15/20 01:58 Dose:  4 mg


Pantoprazole Sodium (Protonix Tab*)  40 mg PO BID [facility]


   Last Admin: 04/14/20 20:55 Dose:  40 mg


Pharmacy Consult (Zosyn Per Pharmacy*)  1 note FOLLOW UP .ZOSYN PER PHARMACY [facility]


Pneumococcal Polyvalent Vaccine (Pneumococcal Vac 23-Polyvalent*)  0.5 ml IM 

.ONCE ONE


   Stop: 04/15/20 09:01


Venlafaxine HCl (Effexor Xr Cap*)  37.5 mg PO DAILY [facility]








 Vital Signs - 8 hr











  04/15/20 04/15/20 04/15/20





  01:58 02:12 03:56


 


Temperature  98.9 F 


 


Pulse Rate  105 


 


Respiratory 20 18 16





Rate   


 


Blood Pressure  123/58 





(mmHg)   


 


O2 Sat by Pulse  93 





Oximetry   











Oxygen Devices in Use Now: None


Appearance: alert, ill appearing, uncomfortable


Eyes: No Scleral Icterus


Ears/Nose/Mouth/Throat: - - dry mucosa


Respiratory: Symmetrical Chest Expansion and Respiratory Effort, Clear to 

Auscultation


Cardiovascular: NL Sounds; No Murmurs; No JVD, RRR


Abdominal: - - very tender to palpation in epigastric/RUQ region, + boateng's 

sign


Lymphatic: No Cervical Adenopathy


Extremities: No Edema


Skin: No Rash or Ulcers


Neurological: Alert and Oriented x 3


Result Diagrams: 


 04/15/20 05:41





 04/15/20 05:41





Assess/Plan/Problems-Billing


Assessment: 





Mr. Dotson is an 79 year old man with poorly controlled DM (a1c >10%) and HTN 

who presented to the ED 4/14 with 48 hours of abdominal pain and was found to 

have acute cholecystitis 





- Patient Problems


(1) Acute cholecystitis


Current Visit: Yes   Status: Acute   Code(s): K81.0 - ACUTE CHOLECYSTITIS   

SNOMED Code(s): 24244038


   Comment: Surgery following, surgical plan pending clinical course 


Pip/tazo day 2 


WBCs increasing today, bili decreasing 


Afebrile


Continue pain control; switch morphine to dilaudid today 


Increase IVF 


   





(2) Sepsis


Current Visit: Yes   Status: Acute   Comment: due to #1 


needs more fluid resuscitation 


hemodynamically stable 


   





(3) Diabetes mellitus


Current Visit: Yes   Status: Acute   Code(s): E11.9 - TYPE 2 DIABETES MELLITUS 

WITHOUT COMPLICATIONS   SNOMED Code(s): 15623393


   Comment: uncontrolled 


a1c from last week from PCPs office was >10% 


increase insulin coverage today --> NPH 10U now and then increase lantus to 40U 

tonight    





(4) HTN (hypertension)


Current Visit: Yes   Status: Acute   Code(s): I10 - ESSENTIAL (PRIMARY) 

HYPERTENSION   SNOMED Code(s): 20708318


   Comment: normotensive off antihypertensives


continue to hold given sepsis    





(5) Abnormal EKG


Current Visit: Yes   Status: Acute   Code(s): R94.31 - ABNORMAL 

ELECTROCARDIOGRAM [ECG] [EKG]   SNOMED Code(s): 989588536


   Comment: will attempt to get old ekg from PCPs office 


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    





(6) CRISTIAN (acute kidney injury)


Current Visit: Yes   Status: Acute   Code(s): N17.9 - ACUTE KIDNEY FAILURE, 

UNSPECIFIED   SNOMED Code(s): 71831621


   Comment: will attempt to get old BMP from PCPs office as well


increase IVF today

## 2020-04-15 NOTE — PN
Progress Note





- Progress Note


Date of Service: 04/15/20


Note: 


S: Pt states his pain has not improved. Tolerating clear liquid diet. No nausea

, emesis, fever, SOB, chest pain. 





O:


 











Temp Pulse Resp BP Pulse Ox


 


 99.6 F   106   17   124/59   93 


 


 04/15/20 07:47  04/15/20 07:47  04/15/20 07:47  04/15/20 07:47  04/15/20 07:47








 Laboratory Last Values











WBC  21.2 10^3/uL (3.5-10.8)  H  04/15/20  05:41    


 


RBC  4.98 10^6 /uL (4.18-5.48)   04/15/20  05:41    


 


Hgb  14.2 g/dL (14.0-18.0)   04/15/20  05:41    


 


Hct  41 % (42-52)  L  04/15/20  05:41    


 


MCV  81 fL (80-94)   04/15/20  05:41    


 


MCH  28 pg (27-31)   04/15/20  05:41    


 


MCHC  35 g/dL (31-36)   04/15/20  05:41    


 


RDW  13 % (10-15)   04/15/20  05:41    


 


Plt Count  202 10^3/uL (150-450)   04/15/20  05:41    


 


MPV  7.7 fL (7.4-10.4)   04/15/20  05:41    


 


Neut % (Auto)  92.0 %  04/15/20  05:41    


 


Lymph % (Auto)  3.1 %  04/15/20  05:41    


 


Mono % (Auto)  4.5 %  04/15/20  05:41    


 


Eos % (Auto)  0.0 %  04/15/20  05:41    


 


Baso % (Auto)  0.4 %  04/15/20  05:41    


 


Absolute Neuts (auto)  19.5 10^3/ul (1.5-7.7)  H  04/15/20  05:41    


 


Absolute Lymphs (auto)  0.6 10^3/ul (1.0-4.8)  L  04/15/20  05:41    


 


Absolute Monos (auto)  1.0 10^3/ul (0-0.8)  H  04/15/20  05:41    


 


Absolute Eos (auto)  0.0 10^3/ul (0-0.6)   04/15/20  05:41    


 


Absolute Basos (auto)  0.1 10^3/ul (0-0.2)   04/15/20  05:41    


 


Absolute Nucleated RBC  0.0 10^3/ul  04/15/20  05:41    


 


Nucleated RBC %  0.0   04/15/20  05:41    


 


INR (Anticoag Therapy)  1.55  (0.82-1.09)  H  04/15/20  05:41    


 


APTT  37.4 seconds (26.0-38.0)   04/14/20  12:54    


 


Sodium  131 mmol/L (135-145)  L  04/15/20  05:41    


 


Potassium  3.4 mmol/L (3.5-5.0)  L  04/15/20  05:41    


 


Chloride  97 mmol/L (101-111)  L  04/15/20  05:41    


 


Carbon Dioxide  23 mmol/L (22-32)   04/15/20  05:41    


 


Anion Gap  11 mmol/L (2-11)   04/15/20  05:41    


 


BUN  25 mg/dL (6-24)  H  04/15/20  05:41    


 


Creatinine  1.77 mg/dL (0.67-1.17)  H  04/15/20  05:41    


 


Est GFR ( Amer)  45.0  (>60)   04/15/20  05:41    


 


Est GFR (Non-Af Amer)  37.2  (>60)   04/15/20  05:41    


 


BUN/Creatinine Ratio  14.1  (8-20)   04/15/20  05:41    


 


Glucose  308 mg/dL ()  H  04/15/20  05:41    


 


POC Glucose (mg/dL)  391 mg/dL ()  H  04/14/20  20:47    


 


Lactic Acid  1.4 mmol/L (0.5-2.0)   04/14/20  12:54    


 


Calcium  8.2 mg/dL (8.6-10.3)  L  04/15/20  05:41    


 


Magnesium  1.2 mg/dL (1.9-2.7)  L  04/14/20  12:54    


 


Total Bilirubin  2.70 mg/dL (0.2-1.0)  H  04/15/20  05:41    


 


AST  21 U/L (13-39)   04/15/20  05:41    


 


ALT  25 U/L (7-52)   04/15/20  05:41    


 


Alkaline Phosphatase  71 U/L ()   04/15/20  05:41    


 


Total Creatine Kinase  100 U/L ()   04/14/20  12:54    


 


CK-MB (CK-2)  2.3 ng/mL (0.6-6.3)   04/14/20  12:54    


 


Troponin I  0.01 ng/mL (<0.03)   04/14/20  18:58    


 


C-Reactive Protein  264.69 mg/L (<8.01)  H  04/14/20  12:54    


 


B-Natriuretic Peptide  66 pg/mL (<=100)   04/14/20  12:54    


 


Total Protein  5.8 g/dL (6.4-8.9)  L  04/15/20  05:41    


 


Albumin  3.0 g/dL (3.2-5.2)  L  04/15/20  05:41    


 


Globulin  2.8 g/dL (2-4)   04/15/20  05:41    


 


Albumin/Globulin Ratio  1.1  (1-3)   04/15/20  05:41    


 


Amylase  17 U/L ()  L  04/14/20  12:54    


 


Lipase  < 10 U/L (11.0-82.0)  L  04/14/20  12:54    


 


TSH  1.17 mcIU/mL (0.34-5.60)   04/14/20  12:54    


 


Urine Color  Yellow   04/14/20  12:11    


 


Urine Appearance  Cloudy   04/14/20  12:11    


 


Urine pH  6.0  (5-9)   04/14/20  12:11    


 


Ur Specific Gravity  1.021  (1.010-1.030)   04/14/20  12:11    


 


Urine Protein  2+(100 mg/dl)  (Negative)  A  04/14/20  12:11    


 


Urine Ketones  Trace  (Negative)  A  04/14/20  12:11    


 


Urine Blood  1+  (Negative)  A  04/14/20  12:11    


 


Urine Nitrate  Negative  (Negative)   04/14/20  12:11    


 


Urine Bilirubin  Negative  (Negative)   04/14/20  12:11    


 


Urine Urobilinogen  Positive  (Negative)  A  04/14/20  12:11    


 


Ur Leukocyte Esterase  Negative  (Negative)   04/14/20  12:11    


 


Urine WBC (Auto)  Trace(0-5/hpf)  (Absent)   04/14/20  12:11    


 


Urine RBC (Auto)  Trace(0-2/hpf)  (Absent)   04/14/20  12:11    


 


Ur Squamous Epith Cells  Present  (Absent)  A  04/14/20  12:11    


 


Urine Bacteria  Absent  (Absent)   04/14/20  12:11    


 


Hyaline Casts  Present  (Absent)  A  04/14/20  12:11    


 


Granular Casts  Present  (Absent)  A  04/14/20  12:11    


 


Urine Glucose  3+(>=500 mg/dl)  (Negative)  A  04/14/20  12:11    








PEX


General: Alert, in NAD.


HEENT: Oropharynx clear. PERRLA.


Heart: RRR.


Lungs: CTAB. 


ABD: BS present. Nondistended. Tender in RUQ, + boateng's sign. No guarding or 

rebound tenderness.


Extremities: Distal pulses intact BL. No edema. Calves soft and nontender.








Assessment and plan: 80 yo M with acute cholecystitis. Continue IV abx, IV 

fluids. Pain control. Tot bili is lower today. May require surgical 

intervention (laparoscopic cholecystectomy) if no improvement.